# Patient Record
Sex: MALE | Race: WHITE | NOT HISPANIC OR LATINO | Employment: UNEMPLOYED | URBAN - NONMETROPOLITAN AREA
[De-identification: names, ages, dates, MRNs, and addresses within clinical notes are randomized per-mention and may not be internally consistent; named-entity substitution may affect disease eponyms.]

---

## 2018-11-27 ENCOUNTER — TELEPHONE (OUTPATIENT)
Dept: UROLOGY | Facility: CLINIC | Age: 58
End: 2018-11-27

## 2018-11-27 ENCOUNTER — OFFICE VISIT (OUTPATIENT)
Dept: UROLOGY | Facility: CLINIC | Age: 58
End: 2018-11-27

## 2018-11-27 VITALS — WEIGHT: 270 LBS | HEIGHT: 76 IN | BODY MASS INDEX: 32.88 KG/M2

## 2018-11-27 DIAGNOSIS — N40.0 BPH WITHOUT URINARY OBSTRUCTION: ICD-10-CM

## 2018-11-27 DIAGNOSIS — R53.82 CHRONIC FATIGUE: Primary | ICD-10-CM

## 2018-11-27 DIAGNOSIS — N52.02 CORPORO-VENOUS OCCLUSIVE ERECTILE DYSFUNCTION: ICD-10-CM

## 2018-11-27 DIAGNOSIS — R79.89 LOW TESTOSTERONE: ICD-10-CM

## 2018-11-27 DIAGNOSIS — B37.42 CANDIDAL BALANITIS: ICD-10-CM

## 2018-11-27 LAB
DEPRECATED RDW RBC AUTO: 40.6 FL (ref 37–54)
ERYTHROCYTE [DISTWIDTH] IN BLOOD BY AUTOMATED COUNT: 13.7 % (ref 11.5–14.5)
HCT VFR BLD AUTO: 46.7 % (ref 42–52)
HGB BLD-MCNC: 15.8 G/DL (ref 14–18)
MCH RBC QN AUTO: 27.7 PG (ref 27–33)
MCHC RBC AUTO-ENTMCNC: 33.8 G/DL (ref 33–37)
MCV RBC AUTO: 81.8 FL (ref 80–94)
PLATELET # BLD AUTO: 172 10*3/MM3 (ref 130–400)
PMV BLD AUTO: 11.6 FL (ref 6–10)
PSA SERPL-MCNC: 0.29 NG/ML (ref 0–4)
RBC # BLD AUTO: 5.71 10*6/MM3 (ref 4.7–6.1)
TESTOST SERPL-MCNC: 107.44 NG/DL (ref 86.98–780.1)
WBC NRBC COR # BLD: 6.33 10*3/MM3 (ref 4.5–12.5)

## 2018-11-27 PROCEDURE — 36415 COLL VENOUS BLD VENIPUNCTURE: CPT | Performed by: UROLOGY

## 2018-11-27 PROCEDURE — 84153 ASSAY OF PSA TOTAL: CPT | Performed by: UROLOGY

## 2018-11-27 PROCEDURE — 85027 COMPLETE CBC AUTOMATED: CPT | Performed by: UROLOGY

## 2018-11-27 PROCEDURE — 99214 OFFICE O/P EST MOD 30 MIN: CPT | Performed by: UROLOGY

## 2018-11-27 PROCEDURE — 84403 ASSAY OF TOTAL TESTOSTERONE: CPT | Performed by: UROLOGY

## 2018-11-27 RX ORDER — CLOTRIMAZOLE AND BETAMETHASONE DIPROPIONATE 10; .64 MG/G; MG/G
CREAM TOPICAL 2 TIMES DAILY
Qty: 45 G | Refills: 2 | Status: SHIPPED | OUTPATIENT
Start: 2018-11-27 | End: 2022-09-09

## 2018-11-27 RX ORDER — CLONAZEPAM 1 MG/1
TABLET ORAL
COMMUNITY
Start: 2018-11-15 | End: 2022-09-09

## 2018-11-27 RX ORDER — ATENOLOL 50 MG/1
TABLET ORAL
COMMUNITY
Start: 2018-11-15

## 2018-11-27 RX ORDER — ASPIRIN 81 MG/1
81 TABLET ORAL DAILY
COMMUNITY

## 2018-11-27 RX ORDER — TESTOSTERONE CYPIONATE 200 MG/ML
INJECTION, SOLUTION INTRAMUSCULAR
Qty: 10 ML | Refills: 2 | Status: SHIPPED | OUTPATIENT
Start: 2018-11-27 | End: 2022-09-09

## 2018-11-27 NOTE — PROGRESS NOTES
"Chief Complaint:          Chief Complaint   Patient presents with   • Hypogonadism       HPI:   58 y.o. male.  58-year-old white male previously on testosterone therapy is desirous of restarting he is a diabetic and has candidal balanitis.  He also has erectile dysfunction and wants to start penile injections which is certainly quite reasonable. Patient returns today for follow-up.  He is been on testosterone replacement therapy.  He reports a dramatic improvement in his Shad questionnaire: -SHAD-androgen deficiency in the age male questionnaire  The patient was queried regarding the androgen deficiency in the age male questionnaire.  This is a validated questionnaire that was performed on a set of 314 [place] male physicians when it was positive it correlated directly with a 94% chance of low testosterone.  Patient indicates there is a decrease in libido or sex drive, a lack of energy, Decreased  strength and endurance, a decreased \"enjoyment of life\", sad and grumpy feelings with significant difficulty maintaining erections.  He is also been a recent deterioration regarding work performance.  He reports weight loss.  He has good facility and the use of subcutaneous and intramuscular injections as well as comfort level and using the medication in a sterile fashion.  He understands he should use only the prescribed dose.  He's here for appropriate lab monitoring regarding this.  He understands this is a controlled substance and therefore must be watched closely will not be refilled and the medical loss or miss calculation of the dose.  He is very happy with the treatment and therefore wants to continue it.    Past Medical History:        Past Medical History:   Diagnosis Date   • Diabetes mellitus (CMS/[facility])    • Hypertension          Current Meds:     Current Outpatient Medications   Medication Sig Dispense Refill   • aspirin 81 MG EC tablet Take 81 mg by mouth Daily.     • atenolol (TENORMIN) 50 MG tablet      • " clonazePAM (KlonoPIN) 1 MG tablet        No current facility-administered medications for this visit.         Allergies:      No Known Allergies     Past Surgical History:     Past Surgical History:   Procedure Laterality Date   • KNEE SURGERY Right    • SINUS SURGERY           Social History:     Social History     Socioeconomic History   • Marital status: Unknown     Spouse name: Not on file   • Number of children: Not on file   • Years of education: Not on file   • Highest education level: Not on file   Social Needs   • Financial resource strain: Not on file   • Food insecurity - worry: Not on file   • Food insecurity - inability: Not on file   • Transportation needs - medical: Not on file   • Transportation needs - non-medical: Not on file   Occupational History   • Not on file   Tobacco Use   • Smoking status: Never Smoker   • Smokeless tobacco: Current User     Types: Snuff   Substance and Sexual Activity   • Alcohol use: No     Frequency: Never   • Drug use: No   • Sexual activity: Not on file   Other Topics Concern   • Not on file   Social History Narrative   • Not on file       Family History:     Family History   Problem Relation Age of Onset   • Stroke Father    • COPD Mother        Review of Systems:     Review of Systems   Constitutional: Negative.  Negative for chills, fatigue and fever.   HENT: Negative.    Eyes: Negative.    Respiratory: Negative.  Negative for cough, shortness of breath and wheezing.    Cardiovascular: Negative.  Negative for leg swelling.   Gastrointestinal: Negative.  Negative for abdominal pain, nausea and vomiting.   Endocrine: Negative.    Musculoskeletal: Positive for back pain. Negative for joint swelling.   Allergic/Immunologic: Negative.    Neurological: Negative.  Negative for dizziness and headaches.   Hematological: Negative.    Psychiatric/Behavioral: Negative.  Negative for confusion.       Physical Exam:     Physical Exam   Constitutional: He is oriented to person,  place, and time. He appears well-developed and well-nourished.   HENT:   Head: Normocephalic and atraumatic.   Eyes: Conjunctivae and EOM are normal. Pupils are equal, round, and reactive to light.   Neck: Normal range of motion.   Cardiovascular: Normal rate, regular rhythm, normal heart sounds and intact distal pulses.   Pulmonary/Chest: Effort normal and breath sounds normal.   Abdominal: Soft. Bowel sounds are normal.   Genitourinary:   Genitourinary Comments: Soft nontender abdomen with no organomegaly, rigidity, or tenderness.  He has normal external genitalia and uncircumcised phallus with a freely movable foreskin, he has significant candidal balanitis, bilaterally descended testes without masses there is no inguinal hernias adenopathy or abnormalities he had good rectal tone and a large smooth firm prostate.  There is no nodularity or any suspicious rectal abnormalities     Musculoskeletal: Normal range of motion.   Neurological: He is alert and oriented to person, place, and time. He has normal reflexes.   Skin: Skin is warm and dry.   Psychiatric: He has a normal mood and affect. His behavior is normal. Judgment and thought content normal.   Nursing note and vitals reviewed.      I have reviewed the following portions of the patient's history: allergies, current medications, past family history, past medical history, past social history, past surgical history, problem list and ROS and confirm it's accurate.      Procedure:       Assessment/Plan:   -Low testosterone: patient is here for follow-up.  Since beginning the medication he's been very pleased.  He reports a dramatic improvement in his erections, ability to achieve and maintain an erection, improvement in libido, increase in frequency of morning erections, a noticeable weight loss consistent with the treatment.  No development of breast problems or abnormalities.  He's going to have appropriate safety laboratory parameters checked.   He understands  that the new data implicates testosterone with the development of prostate cancer and this is all but been disproven and the medical literature as well as the risks of cardiovascular disease which is actually also been disproven.  He understands that while he is a candidate for topical therapy if he is in contact with children this is not an option because it's been shown to accentuate genitalia development at an early age that this frequently irreversible.  He also understands this is a controlled substance and as such will not be prescribed without appropriate follow-up and appropriate laboratory investigation.  He understands effects on spermatogenesis including the fact that this is not always completely reversible and not always completely limited his ability to father a child.  He has demonstrated facility in the technique of both intramuscular and subcutaneous injection.  And has been taught sterility one drawing up the medication.  Penile injection--the patient is interested in an attempt at a pharmacological penile injection for severe organic erectile dysfunction having failed the traditional oral therapy and having been offered a vacuum erection device.  After an appropriate informed consent including the significant risk of priapism, pain,, and lack of efficacy of the penis was prepped and draped.  Appropriate anatomy was demonstrated including the dorsal nerve complex at 12:00 on the dorsum of the penis and the urethra at 6:00 we recommend injections between 9 and 10:00 on the right side and to 3:00 on the left side up and down the shaft into the corporal body we utilized a 27-gauge needle and began our dose of 0.1 cc of Trymex compound.  Observed for 30 minutes before its results.  We discussed the grading on a 10 scale indicating a 5 would be tumescence with poor axial rigidity.  I indicated that when the patient goes home though be about a 30-40% improvement.  It was emphasized that the erection  should last no more than 2 hours and anything more than that should prompt an immediate call to the office.  We also talked about pharmacological manipulation if the erection lasts for prolonged period including oral Sudafed or terbutaline.  We talked about pharmacological reduction of the erection using phenylephrine.  The patient wishes to proceed.  Candidal balanitis-start topical Lotrisone     Patient's Body mass index is 32.87 kg/m². BMI is above normal parameters. Recommendations include: educational material.          This document has been electronically signed by DHIRAJ WALLACE MD November 27, 2018 11:20 AM

## 2018-11-27 NOTE — TELEPHONE ENCOUNTER
Called patient and confirmed that Rx for PPP will be faxed to pharmacy and once he received he will contact our office and we will make appointment to teach patient how to self administer. Patient voiced understanding.

## 2018-11-28 PROBLEM — N52.02 CORPORO-VENOUS OCCLUSIVE ERECTILE DYSFUNCTION: Status: ACTIVE | Noted: 2018-11-28

## 2018-11-28 PROBLEM — B37.42 CANDIDAL BALANITIS: Status: ACTIVE | Noted: 2018-11-28

## 2018-11-28 PROBLEM — R79.89 LOW TESTOSTERONE: Status: ACTIVE | Noted: 2018-11-28

## 2019-01-08 ENCOUNTER — OFFICE VISIT (OUTPATIENT)
Dept: UROLOGY | Facility: CLINIC | Age: 59
End: 2019-01-08

## 2019-01-08 VITALS — HEIGHT: 76 IN | BODY MASS INDEX: 32.88 KG/M2 | WEIGHT: 270 LBS

## 2019-01-08 DIAGNOSIS — N52.02 CORPORO-VENOUS OCCLUSIVE ERECTILE DYSFUNCTION: Primary | ICD-10-CM

## 2019-01-08 PROCEDURE — 54235 NJX CORPORA CAVERNOSA RX AGT: CPT | Performed by: UROLOGY

## 2019-01-08 NOTE — PROGRESS NOTES
Chief Complaint:          Chief Complaint   Patient presents with   • Erectile Dysfunction     Teach self injections PPP       HPI:   58 y.o. male.  68-year-old white female with severe erectile dysfunction brought his kit night taught him the technique of penile injection I injected 0.2 cc in his penis without complication with about a 30% tumescence he's got to go home he was instructed most importantly is instructed to call should last with a 6 hours    Past Medical History:        Past Medical History:   Diagnosis Date   • Diabetes mellitus (CMS/[facility])    • Hypertension          Current Meds:     Current Outpatient Medications   Medication Sig Dispense Refill   • aspirin 81 MG EC tablet Take 81 mg by mouth Daily.     • atenolol (TENORMIN) 50 MG tablet      • clonazePAM (KlonoPIN) 1 MG tablet      • clotrimazole-betamethasone (LOTRISONE) 1-0.05 % cream Apply  topically to the appropriate area as directed 2 (Two) Times a Day. 45 g 2   • Syringe, Disposable, 3 ML misc Use 3 ml syringe with a 25 gauge  5/8 inch needle 24 each 6   • Testosterone Cypionate (DEPO-TESTOSTERONE) 200 MG/ML injection He is to use 1/2 cc every Monday and Thursday SQ 10 mL 2     No current facility-administered medications for this visit.         Allergies:      No Known Allergies     Past Surgical History:     Past Surgical History:   Procedure Laterality Date   • KNEE SURGERY Right    • SINUS SURGERY           Social History:     Social History     Socioeconomic History   • Marital status: Unknown     Spouse name: Not on file   • Number of children: Not on file   • Years of education: Not on file   • Highest education level: Not on file   Social Needs   • Financial resource strain: Not on file   • Food insecurity - worry: Not on file   • Food insecurity - inability: Not on file   • Transportation needs - medical: Not on file   • Transportation needs - non-medical: Not on file   Occupational History   • Not on file   Tobacco Use   • Smoking  status: Never Smoker   • Smokeless tobacco: Current User     Types: Snuff   Substance and Sexual Activity   • Alcohol use: No     Frequency: Never   • Drug use: No   • Sexual activity: Not on file   Other Topics Concern   • Not on file   Social History Narrative   • Not on file       Family History:     Family History   Problem Relation Age of Onset   • Stroke Father    • COPD Mother        Review of Systems:     Review of Systems   Constitutional: Negative.    HENT: Negative.    Eyes: Negative.    Respiratory: Negative.    Cardiovascular: Negative.    Gastrointestinal: Negative.    Endocrine: Negative.    Musculoskeletal: Negative.    Allergic/Immunologic: Negative.    Neurological: Negative.    Hematological: Negative.    Psychiatric/Behavioral: Negative.        Physical Exam:     Physical Exam   Constitutional: He is oriented to person, place, and time. He appears well-developed and well-nourished.   HENT:   Head: Normocephalic and atraumatic.   Eyes: Conjunctivae and EOM are normal. Pupils are equal, round, and reactive to light.   Neck: Normal range of motion.   Cardiovascular: Normal rate, regular rhythm, normal heart sounds and intact distal pulses.   Pulmonary/Chest: Effort normal and breath sounds normal.   Abdominal: Soft. Bowel sounds are normal.   Genitourinary: Penis normal.   Musculoskeletal: Normal range of motion.   Neurological: He is alert and oriented to person, place, and time. He has normal reflexes.   Skin: Skin is warm and dry.   Psychiatric: He has a normal mood and affect. His behavior is normal. Judgment and thought content normal.   Nursing note and vitals reviewed.      I have reviewed the following portions of the patient's history: allergies, current medications, past family history, past medical history, past social history, past surgical history, problem list and ROS and confirm it's accurate.      Procedure:       Assessment/Plan:   Penile injection--the patient is interested in an  attempt at a pharmacological penile injection for severe organic erectile dysfunction having failed the traditional oral therapy and having been offered a vacuum erection device.  After an appropriate informed consent including the significant risk of priapism, pain,, and lack of efficacy of the penis was prepped and draped.  Appropriate anatomy was demonstrated including the dorsal nerve complex at 12:00 on the dorsum of the penis and the urethra at 6:00 we recommend injections between 9 and 10:00 on the right side and to 3:00 on the left side up and down the shaft into the corporal body we utilized a 27-gauge needle and began our dose of 0.1 cc of Trymex compound.  Observed for 30 minutes before its results.  We discussed the grading on a 10 scale indicating a 5 would be tumescence with poor axial rigidity.  I indicated that when the patient goes home though be about a 30-40% improvement.  It was emphasized that the erection should last no more than 2 hours and anything more than that should prompt an immediate call to the office.  We also talked about pharmacological manipulation if the erection lasts for prolonged period including oral Sudafed or terbutaline.  We talked about pharmacological reduction of the erection using phenylephrine.  The patient wishes to proceed.   I injected 0.2 cc in the left side of his penis at 3:00 produced a 30% tumescence after 30 minutes  Patient's Body mass index is 32.87 kg/m². BMI is above normal parameters. Recommendations include: educational material.          This document has been electronically signed by DHIRAJ WALLACE MD January 8, 2019 10:54 AM

## 2022-09-09 ENCOUNTER — SPECIALTY PHARMACY (OUTPATIENT)
Dept: PHARMACY | Facility: HOSPITAL | Age: 62
End: 2022-09-09

## 2022-09-09 ENCOUNTER — OFFICE VISIT (OUTPATIENT)
Dept: ENDOCRINOLOGY | Facility: CLINIC | Age: 62
End: 2022-09-09

## 2022-09-09 VITALS
HEIGHT: 76 IN | DIASTOLIC BLOOD PRESSURE: 78 MMHG | HEART RATE: 79 BPM | OXYGEN SATURATION: 95 % | SYSTOLIC BLOOD PRESSURE: 126 MMHG | WEIGHT: 280 LBS | BODY MASS INDEX: 34.1 KG/M2

## 2022-09-09 DIAGNOSIS — E11.65 TYPE 2 DIABETES MELLITUS WITH HYPERGLYCEMIA, WITH LONG-TERM CURRENT USE OF INSULIN: Primary | ICD-10-CM

## 2022-09-09 DIAGNOSIS — Z79.4 TYPE 2 DIABETES MELLITUS WITH HYPERGLYCEMIA, WITH LONG-TERM CURRENT USE OF INSULIN: Primary | ICD-10-CM

## 2022-09-09 LAB — GLUCOSE BLDC GLUCOMTR-MCNC: 212 MG/DL (ref 70–130)

## 2022-09-09 PROCEDURE — 99204 OFFICE O/P NEW MOD 45 MIN: CPT | Performed by: NURSE PRACTITIONER

## 2022-09-09 PROCEDURE — 82962 GLUCOSE BLOOD TEST: CPT | Performed by: NURSE PRACTITIONER

## 2022-09-09 RX ORDER — GABAPENTIN 800 MG/1
800 TABLET ORAL 2 TIMES DAILY
COMMUNITY
Start: 2022-08-16

## 2022-09-09 RX ORDER — INSULIN GLARGINE 300 U/ML
100 INJECTION, SOLUTION SUBCUTANEOUS NIGHTLY
COMMUNITY
Start: 2022-08-18

## 2022-09-09 RX ORDER — LISINOPRIL 20 MG/1
20 TABLET ORAL 2 TIMES DAILY
COMMUNITY
Start: 2022-08-16

## 2022-09-09 RX ORDER — ZOLPIDEM TARTRATE 10 MG/1
10 TABLET ORAL NIGHTLY PRN
COMMUNITY
Start: 2022-08-16

## 2022-09-09 RX ORDER — SEMAGLUTIDE 1.34 MG/ML
INJECTION, SOLUTION SUBCUTANEOUS
Qty: 1.5 ML | Refills: 2 | Status: SHIPPED | OUTPATIENT
Start: 2022-09-09 | End: 2023-01-03 | Stop reason: SDUPTHER

## 2022-09-09 RX ORDER — TAMSULOSIN HYDROCHLORIDE 0.4 MG/1
0.4 CAPSULE ORAL DAILY
COMMUNITY
Start: 2022-08-16

## 2022-09-09 RX ORDER — PROMETHAZINE HYDROCHLORIDE 25 MG/1
25 TABLET ORAL AS NEEDED
COMMUNITY
Start: 2022-08-16

## 2022-09-09 RX ORDER — METHADONE HYDROCHLORIDE 10 MG/1
10 TABLET ORAL 4 TIMES DAILY PRN
COMMUNITY
Start: 2022-08-16

## 2022-09-09 RX ORDER — AMOXICILLIN AND CLAVULANATE POTASSIUM 875; 125 MG/1; MG/1
1 TABLET, FILM COATED ORAL 2 TIMES DAILY
COMMUNITY
Start: 2022-08-22

## 2022-09-09 RX ORDER — AMLODIPINE BESYLATE 10 MG/1
10 TABLET ORAL DAILY
COMMUNITY
Start: 2022-06-21

## 2022-09-09 RX ORDER — ALPRAZOLAM 1 MG/1
1 TABLET ORAL 4 TIMES DAILY PRN
COMMUNITY
Start: 2022-08-16

## 2022-09-09 RX ORDER — ALBUTEROL SULFATE 90 UG/1
2 AEROSOL, METERED RESPIRATORY (INHALATION) EVERY 4 HOURS PRN
COMMUNITY
Start: 2022-08-16

## 2022-09-09 RX ORDER — ATORVASTATIN CALCIUM 40 MG/1
40 TABLET, FILM COATED ORAL DAILY
COMMUNITY
Start: 2022-08-16

## 2022-09-09 RX ORDER — LEVOTHYROXINE SODIUM 175 UG/1
175 TABLET ORAL DAILY
COMMUNITY
Start: 2022-08-16

## 2022-09-09 NOTE — ASSESSMENT & PLAN NOTE
-Diabetes is above goal with A1c 9.9.  -Discussed dietary and exercise guidelines with patient and family.  -Discussed the importance of yearly eye exams.  -Discussed the importance of checking BG's regularly. Continue using Freestyle Sarah to check BG's regularly.  -Continue Metformin 1,000 mg BID.  -Continue Toujeo 100 units qhs. When you start having fasting blood sugars less than 90 decrease by 2 units every 3 days until it is lower than 90.  -Start Ozempic 0.25 mg weekly for 4 weeks then increase to 0.5 mg weekly. Patient has no personal history of pancreatitis, no family history of MEN syndrome or medullary thyroid cancer. Possible side effects including nausea, bloating, other GI upset and rarely pancreatitis were discussed. He was advised to call the office with any symptoms or concerns.   -S/S hypoglycemia reviewed with Rule of 15's advised.  -Follow-up in 3 months.

## 2022-09-09 NOTE — PATIENT INSTRUCTIONS
Metformin 1,000 mg twice a day.  Toujeo 100 units nightly.  When you start having fasting blood sugars less than 90 decrease by 2 units every 3 days until it is lower than 90.  -Start Ozempic 0.25 mg weekly for 4 injections, then increase to 0.5 mg weekly.

## 2022-09-09 NOTE — PROGRESS NOTES
Specialty Pharmacy Patient Management Program  Endocrinology Initial Assessment       Irwin Toure is a 61 y.o. male with Type 2 Diabetes seen by an Endocrinology provider and enrolled in the Endocrinology Patient Management program offered by [facility].  An initial assessment was conducted, including assessment of therapy appropriateness and specialty medication education for Metformin and Toujeo. The patient was introduced to services offered by [facility], including: regular assessments, refill coordination, curbside pick-up or mail order delivery options, prior authorization maintenance, and financial assistance programs as applicable. The patient was also provided with contact information for the pharmacy team.     Patient is currently taking metformin 1,000 mg BID and Toujeo 100 units at night. Patient checks BG with Freestyle Sarah with readings averaging around 210 mg/dL. Patient reports rare occasions of low BG <70.     Patient denies personal/family history of thyroid cancer, denies issues with pancreas/pancreatitis, and denies issues with recurrent UTI/yeast infections.     In the past, the patient has tried:     Drug Dose Reason for Discontinuation Notes   Ozempic  Insurance issues                  Insurance Coverage & Financial Support  Medicare    Relevant Past Medical History and Comorbidities  Relevant medical history and concomitant health conditions were discussed with the patient. The patient's chart has been reviewed for relevant past medical history and comorbid health conditions and updated as necessary.   Past Medical History:   Diagnosis Date   • Diabetes mellitus (HCC)    • Hypertension      Social History     Socioeconomic History   • Marital status:    Tobacco Use   • Smoking status: Never Smoker   • Smokeless tobacco: Current User     Types: Snuff   Substance and Sexual Activity   • Alcohol use: No   • Drug use: No        Allergies  Known allergies and reactions were discussed with the patient. The patient's chart has been reviewed for  allergy information and updated as necessary.   Codeine    Current Medication List  This medication list has been reviewed with the patient and evaluated for any interactions or necessary modifications/recommendations, and updated to include all prescription medications, OTC medications, and supplements the patient is currently taking.  This list reflects what is contained in the patient's profile, which has also been marked as reviewed to communicate to other providers it is the most up to date version of the patient's current medication therapy.     Current Outpatient Medications:   •  ALPRAZolam (XANAX) 1 MG tablet, Take 1 mg by mouth 4 (Four) Times a Day As Needed., Disp: , Rfl:   •  amLODIPine (NORVASC) 10 MG tablet, Take 10 mg by mouth Daily., Disp: , Rfl:   •  amoxicillin-clavulanate (AUGMENTIN) 875-125 MG per tablet, Take 1 tablet by mouth 2 (Two) Times a Day. 4 days left, Disp: , Rfl:   •  aspirin 81 MG EC tablet, Take 81 mg by mouth Daily., Disp: , Rfl:   •  atorvastatin (LIPITOR) 40 MG tablet, Take 40 mg by mouth Daily., Disp: , Rfl:   •  Continuous Blood Gluc Sensor (FreeStyle Sarah 2 Sensor) misc, Inject 1 each under the skin into the appropriate area as directed Every 14 (Fourteen) Days., Disp: , Rfl:   •  gabapentin (NEURONTIN) 800 MG tablet, Take 800 mg by mouth 2 (Two) Times a Day., Disp: , Rfl:   •  levothyroxine (SYNTHROID, LEVOTHROID) 175 MCG tablet, Take 175 mcg by mouth Daily., Disp: , Rfl:   •  lisinopril (PRINIVIL,ZESTRIL) 20 MG tablet, Take 20 mg by mouth 2 (Two) Times a Day., Disp: , Rfl:   •  metFORMIN (GLUCOPHAGE) 1000 MG tablet, Take 1,000 mg by mouth 2 (Two) Times a Day., Disp: , Rfl:   •  methadone (DOLOPHINE) 10 MG tablet, Take 10 mg by mouth 4 (Four) Times a Day As Needed,, Disp: , Rfl:   •  promethazine (PHENERGAN) 25 MG tablet, Take 25 mg by mouth As Needed for  Nausea., Disp: , Rfl:   •  Syringe, Disposable, 3 ML misc, Use 3 ml syringe with a 25 gauge  5/8 inch needle, Disp: 24 each, Rfl: 6  •  tamsulosin (FLOMAX) 0.4 MG capsule 24 hr capsule, Take 0.4 mg by mouth Daily., Disp: , Rfl:   •  Toujeo SoloStar 300 UNIT/ML solution pen-injector injection, Inject 100 Units under the skin into the appropriate area as directed Every Night., Disp: , Rfl:   •  Ventolin  (90 Base) MCG/ACT inhaler, Inhale 2 puffs Every 4 (Four) Hours As Needed., Disp: , Rfl:   •  zolpidem (AMBIEN) 10 MG tablet, Take 10 mg by mouth At Night As Needed., Disp: , Rfl:   •  atenolol (TENORMIN) 50 MG tablet, , Disp: , Rfl:   •  Semaglutide,0.25 or 0.5MG/DOS, (Ozempic, 0.25 or 0.5 MG/DOSE,) 2 MG/1.5ML solution pen-injector, 0.25 mg weekly for 4 weeks then increase to 0.5 mg weekly., Disp: 1.5 mL, Rfl: 2    Drug Interactions  The hypoglycemic effect of Insulin may be increased or prolonged by Beta-Adrenergic Blockers. Minor cardiovascular abnormalities may occur during hypoglycemic episodes.    Recommended Medications Assessment  • Aspirin - Currently Taking  • Statin - Currently Taking  • ACEi/ARB - Currently Taking    Current 10-Year ASCVD Risk: need lipid panel    Relevant Laboratory Values    No results found for: HGBA1C  No results found for: GLUCOSE, CALCIUM, NA, K, CO2, CL, BUN, CREATININE, EGFRIFAFRI, EGFRIFNONA, BCR, ANIONGAP  No results found for: CHOL, CHLPL, TRIG, HDL, LDL, LDLDIRECT      Education Provided for DM medications:  The patient has been provided with the following education and any applicable administration techniques (i.e. self-injection) have been demonstrated for the therapies indicated. All questions and concerns have been addressed prior to the patient receiving the medication, and the patient has verbalized understanding of the education and any materials provided.  Additional patient education shall be provided and documented upon request by the patient, provider or payer.       OZEMPIC® (semaglutide)  Medication Expectations   Why am I taking this medication? You are taking Ozempic, along with diet and exercise, to lower blood sugar because you have type 2 diabetes. Diabetes is not curable but with proper medication and treatment, we can keep your blood sugar within your personalized target range. This medication may also help you lose some weight, and it helps reduce the risk of death from heart attack, and stroke in adults with type 2 diabetes and known heart disease.   What should I expect while on this medication? You should expect to see your blood sugar and A1c decrease over time and you may also lose some weight.   How does the medication work? Ozempic is a non-insulin injection that works with your body's own ability to lower blood sugar and A1c and helps your body release its own insulin in response to your blood sugar rising.  This medication also slows down food from leaving your stomach, making you feel hernandez for longer.   How long will I be on this medication for? The amount of time you will be on this medication will be determined by your doctor based on blood sugar and A1c control. You will most likely be on this medication or another diabetes medication throughout your lifetime. Do not abruptly stop this medication without talking to your doctor first.    How do I take this medication? Take as directed on your prescription label. Ozempic is injected under the skin (subcutaneously) of your stomach, thigh or upper arm.  Use this medication once weekly, on the same day each week, and it can be given with or without food.  Use a different injection site each week in the same body region.     For each new prefilled pen, prime the needle before the first injection by turning the dose selector to the flow check symbol and injecting into the air (priming is not required for subsequent injections).   • Once needle is inserted, continue to press the button until the dial has  returned to 0 and for an additional 6 seconds before removing.    What are some possible side effects? You may notice you don't feel as hungry, especially when you first start using Ozempic.  The most common side effects are nausea, diarrhea, vomiting, stomach pain, and constipation.      Stop using Ozempic and call your doctor immediately if you have severe pain in your stomach area that will not go away as this could be a sign of pancreatitis (inflammation of your pancreas).  Tell your doctor if you get a lump or swelling in your neck, hoarseness, difficulty swallowing, or feel short of breath (these may be symptoms of thyroid cancer).  Talk with your doctor if you have changes in vision while taking Ozempic.   What happens if I miss a dose? If you miss a dose, take it as soon as you remember as long as it is within 5 days after your missed dose.  If more than 5 days have passed, skip the missed dose and resume Ozempic on the regularly scheduled day.     Medication Safety   What are things I should warn my doctor immediately about? Do not use Ozempic if you or a family member have ever had medullary thyroid cancer (MTC) or Multiple Endocrine Neoplasia syndrome type 2 (MEN 2).    • Tell your doctor if you get a lump or swelling in your neck, hoarseness, difficulty swallowing, or feel short of breath (these may be symptoms of thyroid cancer).    Tell your doctor if you have or have had problems with your kidneys or pancreas.   • Stop using Ozempic and get medical help right away if you have severe pain in your stomach area that will not go away as this could be a sign of pancreatitis (inflammation of your pancreas)  Tell your doctor if you have problem digesting food or slowed emptying of your stomach (gastroparesis).    Let your doctor know if you have changes in vision while taking Ozempic or have been diagnosed with diabetic retinopathy.    Talk to your doctor if you are pregnant, planning to become pregnant, or  breastfeeding.     Get medical help right away if you notice any signs/symptoms of an allergic reaction (rash, hives, difficulty breathing, etc.).   What are things that I should be cautious of? Be cautious of any side effects from this medication. Talk to your doctor if any new ones develop or aren't getting better.   What are some medications that can interact with this one? Taking Ozempic with other medications that also lower your blood sugar such as insulin and glipizide/glimepiride/glyburide may increase the risk of low blood sugar.  • Your doctor may reduce the dose of these medications when you start Ozempic to minimize low blood sugars    It should not be taken with other medicines called GLP-1 receptor agonists, because these work the same way as Ozempic.      Because Ozempic slows stomach emptying, it can affect the way some medicines work.    Always tell your doctor or pharmacist immediately if you start taking any new medications, including over-the-counter medications, vitamins, and herbal supplements.     Medication Storage/Handling   How should I handle this medication? Keep this medication out of reach of pets/children and keep the pen capped when not in use.   How does this medication need to be stored? Store in the refrigerator prior to first use, but do not freeze.  After first use, you may continue to store in the refrigerator or at room temperature for 56 days.  Protect from excessive heat and sunlight.   How should I dispose of this medication? Used Ozempic pens should be thrown away after 56 days.  Place your used Ozempic pen and needle in an approved sharps container after use.  If you do not have a sharps container, you may use a household container made of heavy-duty plastic with a tight-fitting lid that is leak resistant (e.g., heavy-duty plastic laundry detergent bottle).    If your doctor decides to stop this medication, take to your local police station for proper disposal. Some  pharmacies also have take-back bins for medication drop-off.      Resources/Support   How can I remind myself to take this medication? You can download reminder apps to help you manage your refills. You may also set an alarm on your phone to remind you.    Is financial support available?  [employer] can provide co-pay cards if you have commercial insurance or patient assistance if you have Medicare or no insurance.    Which vaccines are recommended for me? Talk to your doctor about these vaccines:  • Flu   • Coronavirus (COVID-19)   • Pneumococcal (pneumonia)   • Tdap   • Hepatitis B   • Zoster (shingles)        Adherence and Self-Administration  • Barriers to Patient Adherence and/or Self-Administration: None  • Methods for Supporting Patient Adherence and/or Self-Administration: None Required    Vaccination Status:   COVID 19: not interested  Influenza: UTD  Pneumococcal: not interested  Hep B: not interested    Smoker?  • Never    Goals of Therapy  • Patient Goals of Therapy: Take medication everyday  • Clinical Goals or Therapeutic Targets, If Applicable: A1C reduction       Reassessment Plan & Follow-Up  1. Patient's diabetes is not at goal with most recent A1C of 9.9%. He would like to re-start therapy with GLP-1. His insurance will now pay for Ozempic with a $9.85 co-pay. He would be a good candidate for therapy.   2. Recommended appropriate vaccinations to patient. He is not interested. Patient had labs drawn with PCP today. Will assess appropriateness of lipid lowering therapy once resulted.   3. He denies issues with affordability or adherence at this time.        Patient does not wish to use [facility] at this time due to: loyalty to independent pharmacy      Attestation  I attest that the initiated specialty medication(s) are appropriate for the patient based on my assessment.  If the prescribed therapy is at any point deemed not appropriate based on the current or future assessments, a  consultation will be initiated with the patient's specialty care provider to determine the best course of action. The revised plan of therapy will be documented along with any additional patient education provided.     Shonda Harris, Pharmacy Intern  Elda Baez PharmD  9/9/2022  14:54 EDT

## 2022-09-09 NOTE — PROGRESS NOTES
Chief Complaint   Patient presents with   • Diabetes     He had an A1C done in May   9.9%  ....   He had an updated A1C done this morning at [facility] and labs are not back yet.         Referring Provider  [facility]ann[facility] A* HPI   Irwin Toure is a 61 y.o. male had concerns including Diabetes (He had an A1C done in May   9.9%  ....   He had an updated A1C done this morning at [facility] and labs are not back yet. ).    Seen as a new patient.    Diabetes was diagnosed [id number].  Complications include neuropathy. He had a left great big toe amputation.  Last ophtho exam was 7-8 years ago.  Current medications for diabetes include Metformin 1,000 mg BID, Toujeo 100 units qhs.  Past meds; Ozempic-coverage issues  He checks his blood sugar Freestyle Sarah times per day.   Hypos: rarely    Labs:  A1C: 9.9 (5/2022)    The following portions of the patient's history were reviewed and updated as appropriate: allergies, current medications, past family history, past medical history, past social history, past surgical history and problem list.    Diet: he tries to limit his carbs and sugars.    Past Medical History:   Diagnosis Date   • Diabetes mellitus (HCC)    • Hypertension      Past Surgical History:   Procedure Laterality Date   • KNEE SURGERY Right    • SINUS SURGERY        Family History   Problem Relation Age of Onset   • Stroke Father    • COPD Mother       Social History     Socioeconomic History   • Marital status:    Tobacco Use   • Smoking status: Never Smoker   • Smokeless tobacco: Current User     Types: Snuff   Substance and Sexual Activity   • Alcohol use: No   • Drug use: No      Allergies   Allergen Reactions   • Codeine Hives      Current Outpatient Medications on File Prior to Visit   Medication Sig Dispense Refill   • aspirin 81 MG EC tablet Take 81 mg by mouth Daily.     • atenolol (TENORMIN) 50 MG tablet      • Syringe, Disposable, 3 ML misc Use 3  "ml syringe with a 25 gauge  5/8 inch needle 24 each 6   • [DISCONTINUED] clonazePAM (KlonoPIN) 1 MG tablet      • [DISCONTINUED] clotrimazole-betamethasone (LOTRISONE) 1-0.05 % cream Apply  topically to the appropriate area as directed 2 (Two) Times a Day. 45 g 2   • [DISCONTINUED] Testosterone Cypionate (DEPO-TESTOSTERONE) 200 MG/ML injection He is to use 1/2 cc every Monday and Thursday SQ 10 mL 2     No current facility-administered medications on file prior to visit.        Review of Systems   Constitutional: Positive for fatigue.   Eyes: Positive for blurred vision.   Endocrine: Positive for polyuria. Negative for polydipsia and polyphagia.   Psychiatric/Behavioral: Positive for agitation and sleep disturbance. The patient is nervous/anxious.    All other systems reviewed and are negative.     /78 (BP Location: Left arm, Patient Position: Sitting, Cuff Size: Large Adult)   Pulse 79   Ht 193 cm (76\")   Wt 127 kg (280 lb)   SpO2 95%   BMI 34.08 kg/m²      Physical Exam  Vitals reviewed.   Constitutional:       Appearance: Normal appearance.   Eyes:      Extraocular Movements: Extraocular movements intact.   Neck:      Thyroid: No thyroid mass, thyromegaly or thyroid tenderness.   Cardiovascular:      Rate and Rhythm: Normal rate and regular rhythm.      Pulses: Normal pulses.      Heart sounds: Normal heart sounds.   Pulmonary:      Effort: Pulmonary effort is normal.      Breath sounds: Normal breath sounds.   Skin:     General: Skin is warm and dry.   Neurological:      Mental Status: He is alert and oriented to person, place, and time.   Psychiatric:         Mood and Affect: Mood normal.         Behavior: Behavior normal.         Thought Content: Thought content normal.         Judgment: Judgment normal.       CMP:     Lipid Panel:  No results found for: CHOL, TRIG, HDL, VLDL, LDL  HbA1c:     Glucose:  Lab Results   Component Value Date    POCGLU 212 (A) 09/09/2022     Microalbumin:  No results " found for: JULISSA  TSH:  No results found for: TSH    Assessment and Plan    Diagnoses and all orders for this visit:    1. Type 2 diabetes mellitus with hyperglycemia, with long-term current use of insulin (HCC) (Primary)  Assessment & Plan:  -Diabetes is above goal with A1c 9.9.  -Discussed dietary and exercise guidelines with patient and family.  -Discussed the importance of yearly eye exams.  -Discussed the importance of checking BG's regularly. Continue using Freestyle Sarah to check BG's regularly.  -Continue Metformin 1,000 mg BID.  -Continue Toujeo 100 units qhs. When you start having fasting blood sugars less than 90 decrease by 2 units every 3 days until it is lower than 90.  -Start Ozempic 0.25 mg weekly for 4 weeks then increase to 0.5 mg weekly. Patient has no personal history of pancreatitis, no family history of MEN syndrome or medullary thyroid cancer. Possible side effects including nausea, bloating, other GI upset and rarely pancreatitis were discussed. He was advised to call the office with any symptoms or concerns.   -S/S hypoglycemia reviewed with Rule of 15's advised.  -Follow-up in 3 months.    Orders:  -     POC Glucose Fingerstick    Other orders  -     Semaglutide,0.25 or 0.5MG/DOS, (Ozempic, 0.25 or 0.5 MG/DOSE,) 2 MG/1.5ML solution pen-injector; 0.25 mg weekly for 4 weeks then increase to 0.5 mg weekly.  Dispense: 1.5 mL; Refill: 2       Return in about 3 months (around 12/9/2022) for Follow-up appointment, A1C. The patient was instructed to contact the clinic with any interval questions or concerns.        This document has been electronically signed by SHAHRAM Rhoades  September 9, 2022 11:42 EDT   Endocrinology

## 2023-01-03 RX ORDER — SEMAGLUTIDE 1.34 MG/ML
0.5 INJECTION, SOLUTION SUBCUTANEOUS WEEKLY
Qty: 1.5 ML | Refills: 2 | Status: SHIPPED | OUTPATIENT
Start: 2023-01-03